# Patient Record
Sex: FEMALE | Race: WHITE | URBAN - METROPOLITAN AREA
[De-identification: names, ages, dates, MRNs, and addresses within clinical notes are randomized per-mention and may not be internally consistent; named-entity substitution may affect disease eponyms.]

---

## 2017-08-24 ENCOUNTER — BOTOX (OUTPATIENT)
Dept: URBAN - METROPOLITAN AREA CLINIC 14 | Facility: CLINIC | Age: 57
Setting detail: DERMATOLOGY
End: 2017-08-24

## 2017-08-29 ENCOUNTER — SKIN CHECK (OUTPATIENT)
Dept: URBAN - METROPOLITAN AREA CLINIC 14 | Facility: CLINIC | Age: 57
Setting detail: DERMATOLOGY
End: 2017-08-29

## 2017-08-29 DIAGNOSIS — Z09 ENCOUNTER FOR FOLLOW-UP EXAMINATION AFTER COMPLETED TREATMENT FOR CONDITIONS OTHER THAN MALIGNANT NEOPLASM: ICD-10-CM

## 2017-08-29 PROCEDURE — 99213 OFFICE O/P EST LOW 20 MIN: CPT

## 2017-08-29 RX ORDER — CLINDAMYCIN PHOSPHATE 10 MG/ML
1 APPLICATION SOLUTION TOPICAL BID
Qty: 60 | Refills: 4 | Status: DISCONTINUED
Start: 2017-08-29 | End: 2019-08-23

## 2017-08-29 RX ORDER — PREDNISONE 20 MG/1
ADD'L SIG TABLET TABLET ORAL ADD'L SIG
Qty: 42 | Refills: 0 | Status: DISCONTINUED
Start: 2017-08-29 | End: 2019-08-23

## 2017-08-29 RX ORDER — KETOCONAZOLE 20 MG/ML
1 APPLICATION SHAMPOO, SUSPENSION TOPICAL DAILY
Qty: 120 | Refills: 4 | Status: DISCONTINUED
Start: 2017-08-29 | End: 2019-08-23

## 2017-12-05 ENCOUNTER — BOTOX (OUTPATIENT)
Dept: URBAN - METROPOLITAN AREA CLINIC 14 | Facility: CLINIC | Age: 57
Setting detail: DERMATOLOGY
End: 2017-12-05

## 2018-01-30 ENCOUNTER — LASER (OUTPATIENT)
Dept: URBAN - METROPOLITAN AREA CLINIC 14 | Facility: CLINIC | Age: 58
Setting detail: DERMATOLOGY
End: 2018-01-30

## 2018-02-21 ENCOUNTER — LASER (OUTPATIENT)
Dept: URBAN - METROPOLITAN AREA CLINIC 14 | Facility: CLINIC | Age: 58
Setting detail: DERMATOLOGY
End: 2018-02-21

## 2018-03-21 ENCOUNTER — LASER (OUTPATIENT)
Dept: URBAN - METROPOLITAN AREA CLINIC 14 | Facility: CLINIC | Age: 58
Setting detail: DERMATOLOGY
End: 2018-03-21

## 2018-03-28 ENCOUNTER — RX ONLY (RX ONLY)
Age: 58
End: 2018-03-28

## 2018-03-28 RX ORDER — DOXYCYCLINE 40 MG/1
1 CAPSULE CAPSULE ORAL DAILY
Qty: 30 | Refills: 3 | Status: DISCONTINUED
Start: 2018-03-28 | End: 2019-08-23

## 2018-04-18 ENCOUNTER — OTHER- (OUTPATIENT)
Dept: URBAN - METROPOLITAN AREA CLINIC 14 | Facility: CLINIC | Age: 58
Setting detail: DERMATOLOGY
End: 2018-04-18

## 2018-05-30 ENCOUNTER — LASER (OUTPATIENT)
Dept: URBAN - METROPOLITAN AREA CLINIC 14 | Facility: CLINIC | Age: 58
Setting detail: DERMATOLOGY
End: 2018-05-30

## 2018-06-12 ENCOUNTER — RX ONLY (RX ONLY)
Age: 58
End: 2018-06-12

## 2018-06-12 RX ORDER — SPIRONOLACTONE 25 MG/1
2 TABLET TABLET, FILM COATED ORAL DAILY
Qty: 180 | Refills: 3 | Status: DISCONTINUED
Start: 2018-06-12 | End: 2019-08-23

## 2018-08-22 ENCOUNTER — OTHER- (OUTPATIENT)
Dept: URBAN - METROPOLITAN AREA CLINIC 14 | Facility: CLINIC | Age: 58
Setting detail: DERMATOLOGY
End: 2018-08-22

## 2018-12-11 ENCOUNTER — BOTOX (OUTPATIENT)
Dept: URBAN - METROPOLITAN AREA CLINIC 14 | Facility: CLINIC | Age: 58
Setting detail: DERMATOLOGY
End: 2018-12-11

## 2018-12-12 ENCOUNTER — RX ONLY (RX ONLY)
Age: 58
End: 2018-12-12

## 2018-12-12 RX ORDER — DOXYCYCLINE HYCLATE 50 MG/1
1 CAPSULE CAPSULE ORAL DAILY
Qty: 30 | Refills: 6 | Status: DISCONTINUED
Start: 2018-12-12 | End: 2019-08-23

## 2019-04-16 ENCOUNTER — BOTOX (OUTPATIENT)
Dept: URBAN - METROPOLITAN AREA CLINIC 14 | Facility: CLINIC | Age: 59
Setting detail: DERMATOLOGY
End: 2019-04-16

## 2019-08-23 ENCOUNTER — OTHER- (OUTPATIENT)
Dept: URBAN - METROPOLITAN AREA CLINIC 14 | Facility: CLINIC | Age: 59
Setting detail: DERMATOLOGY
End: 2019-08-23

## 2019-08-23 DIAGNOSIS — C44.319 BASAL CELL CARCINOMA OF SKIN OF OTHER PARTS OF FACE: ICD-10-CM

## 2019-08-23 PROCEDURE — 11310 SHAVE SKIN LESION 0.5 CM/<: CPT

## 2020-02-19 RX ORDER — ADAPALENE AND BENZOYL PEROXIDE 3; 25 MG/G; MG/G
1 ML GEL TOPICAL ADD'L SIG
Qty: 45 | Refills: 3 | Status: DISCONTINUED
Start: 2020-02-19 | End: 2020-04-14

## 2020-02-19 RX ORDER — DOXYCYCLINE HYCLATE 100 MG/1
1 CAPSULE CAPSULE, GELATIN COATED ORAL DAILY
Qty: 30 | Refills: 2 | Status: DISCONTINUED
Start: 2020-02-19 | End: 2020-03-12

## 2021-09-03 RX ORDER — MOMETASONE FUROATE 1 MG/G
1 A SMALL AMOUNT OINTMENT TOPICAL TWICE A DAY
Qty: 45 | Refills: 1
Start: 2021-09-03

## 2022-12-27 ENCOUNTER — APPOINTMENT (OUTPATIENT)
Dept: URBAN - METROPOLITAN AREA SURGERY 17 | Age: 62
Setting detail: DERMATOLOGY
End: 2022-12-28

## 2022-12-27 VITALS
SYSTOLIC BLOOD PRESSURE: 96 MMHG | RESPIRATION RATE: 124 BRPM | DIASTOLIC BLOOD PRESSURE: 54 MMHG | HEIGHT: 70 IN | TEMPERATURE: 98.1 F | WEIGHT: 150 LBS | HEART RATE: 48 BPM

## 2022-12-27 DIAGNOSIS — Z80.8 FAMILY HISTORY OF MALIGNANT NEOPLASM OF OTHER ORGANS OR SYSTEMS: ICD-10-CM

## 2022-12-27 PROBLEM — C44.311 BASAL CELL CARCINOMA OF SKIN OF NOSE: Status: ACTIVE | Noted: 2022-12-27

## 2022-12-27 PROBLEM — C44.519 BASAL CELL CARCINOMA OF SKIN OF OTHER PART OF TRUNK: Status: ACTIVE | Noted: 2022-12-27

## 2022-12-27 PROCEDURE — OTHER MIPS QUALITY: OTHER

## 2022-12-27 PROCEDURE — OTHER MOHS SURGERY: OTHER

## 2022-12-27 PROCEDURE — 13151 CMPLX RPR E/N/E/L 1.1-2.5 CM: CPT | Mod: 59

## 2022-12-27 PROCEDURE — OTHER COUNSELING: OTHER

## 2022-12-27 PROCEDURE — 17311 MOHS 1 STAGE H/N/HF/G: CPT

## 2022-12-27 PROCEDURE — OTHER CONSULTATION FOR ELECTRODESICCATION AND CURETTAGE: OTHER

## 2022-12-27 PROCEDURE — 17312 MOHS ADDL STAGE: CPT

## 2022-12-27 PROCEDURE — OTHER CONSULTATION FOR MOHS SURGERY: OTHER

## 2022-12-27 PROCEDURE — OTHER CURETTAGE AND DESTRUCTION: OTHER

## 2022-12-27 PROCEDURE — 17262 DSTRJ MAL LES T/A/L 1.1-2.0: CPT | Mod: 59

## 2022-12-27 NOTE — PROCEDURE: MOHS SURGERY

## 2022-12-27 NOTE — PROCEDURE: MOHS SURGERY
Failed Protocol   ESCITALOPRAM 10 MG TABLET  TAKE 1 TABLET BY MOUTH EVERY DAY    SEEN: 3/17/20 by Dr. Jennings and 8/30/21 by           Dr. Shira Soto/Dr. Vickers December    RENEWED: 9/28/20 #30 +5 refills by Dr. Jennings    NEXT VISIT: none       Mercedes Flap Text: The defect edges were debeveled with a #15 scalpel blade.  Given the location of the defect, shape of the defect and the proximity to free margins a Mercedes flap was deemed most appropriate.  Using a sterile surgical marker, an appropriate advancement flap was drawn incorporating the defect and placing the expected incisions within the relaxed skin tension lines where possible. The area thus outlined was incised deep to adipose tissue with a #15 scalpel blade.  The skin margins were undermined to an appropriate distance in all directions utilizing iris scissors.

## 2022-12-27 NOTE — PROCEDURE: CONSULTATION FOR ELECTRODESICCATION AND CURETTAGE
Size Of Lesion: 0.8
X Size Of Lesion In Cm (Optional): 0.9
Referring Provider (Optional): JAXON Bernal PA-C
Anatomic Location From Referring Provider: Left upper back
Detail Level: Detailed

## 2022-12-27 NOTE — PROCEDURE: CONSULTATION FOR MOHS SURGERY
Detail Level: Detailed
Body Location Override (Optional - Billing Will Still Be Based On Selected Body Map Location If Applicable): Right alar crease
Size Of Lesion: 0.3
Name Of The Referring Provider For Procedure: JAXON Bernal PA-C
Incorporate Mauc In Note: Yes

## 2022-12-27 NOTE — PROCEDURE: CURETTAGE AND DESTRUCTION
Post-Care Instructions: I reviewed with the patient in detail post-care instructions. Patient is to keep the area dry for 48 hours, and not to engage in any swimming until the area is healed. Should the patient develop any fevers, chills, bleeding, severe pain patient will contact the office immediately. A pressure bandage with telfa and petrolatum was applied and wound care instruction were reviewed and provided.

## 2022-12-27 NOTE — PROCEDURE: MOHS SURGERY

## 2022-12-27 NOTE — PROCEDURE: MOHS SURGERY

## 2023-02-02 ENCOUNTER — APPOINTMENT (OUTPATIENT)
Dept: URBAN - METROPOLITAN AREA SURGERY 17 | Age: 63
Setting detail: DERMATOLOGY
End: 2023-02-03

## 2023-02-02 DIAGNOSIS — L90.5 SCAR CONDITIONS AND FIBROSIS OF SKIN: ICD-10-CM

## 2023-02-02 PROCEDURE — 99024 POSTOP FOLLOW-UP VISIT: CPT

## 2023-02-02 PROCEDURE — OTHER DESTRUCTION: CO2 LASER: OTHER

## 2023-02-02 ASSESSMENT — LOCATION DETAILED DESCRIPTION DERM: LOCATION DETAILED: LEFT SUPERIOR NASAL CHEEK

## 2023-02-02 ASSESSMENT — LOCATION ZONE DERM: LOCATION ZONE: FACE

## 2023-02-02 ASSESSMENT — LOCATION SIMPLE DESCRIPTION DERM: LOCATION SIMPLE: LEFT CHEEK

## 2023-02-02 NOTE — PROCEDURE: DESTRUCTION: CO2 LASER
Energy(Mj-Leave At Zero If Unwanted): 200
Anesthesia Type: 0.5% lidocaine with 1:200,000 epinephrine
Laser Mode: Pulsed
Post-Care Instructions: I reviewed with the patient in detail post-care instructions. Patient should avoid sun until the area is fully healed. Pt should apply vaseline to treated areas.
Treatment Number: 1
Was Feathering Performed?: Yes
Anesthesia Volume In Cc: 2
Wavelength: 10,600nm
Feathering Statement: The wound edges were feathered.
Detail Level: Detailed
Power ([place]-Leave At Zero If Unwanted): 40
Spot Sizes: 3mm
Consent: Written consent obtained, risks reviewed including but not limited to crusting, scabbing, blistering, scarring, darker or lighter pigmentary change, incomplete improvement, infection and bleeding, and even scar becoming worse in appearance.
External Cooling Fan Speed: 5
Post-Care Statement: Following the procedure, vaseline and a bandage were applied. A detailed handout and wound care lesson were provided.
Laser Type: UltraPulse Fractional CO2 laser

## 2023-02-02 NOTE — HPI: SCAR (COMPLEX), FACE
What Are Your Concerns? (Check All That Apply): texture
Is This A New Presentation, Or A Follow-Up?: Facial Scar

## 2024-04-08 NOTE — PROCEDURE: MOHS SURGERY
[Negative] : Heme/Lymph [Heartburn] : heartburn Consent (Scalp)/Introductory Paragraph: The rationale for Mohs was explained to the patient and consent was obtained. The risks, benefits and alternatives to therapy were discussed in detail. Specifically, the risks of changes in hair growth pattern secondary to repair, infection, scarring, bleeding, prolonged wound healing, incomplete removal, allergy to anesthesia, nerve injury and recurrence were addressed. Prior to the procedure, the treatment site was clearly identified and confirmed by the patient. All components of Universal Protocol/PAUSE Rule completed. [Joint Pain] : joint pain [Muscle Pain] : muscle pain [Back Pain] : back pain [Insomnia] : insomnia [Constipation] : constipation